# Patient Record
Sex: FEMALE | Employment: OTHER | URBAN - METROPOLITAN AREA
[De-identification: names, ages, dates, MRNs, and addresses within clinical notes are randomized per-mention and may not be internally consistent; named-entity substitution may affect disease eponyms.]

---

## 2017-06-02 ENCOUNTER — HOSPITAL ENCOUNTER (OUTPATIENT)
Dept: PHYSICAL THERAPY | Age: 57
Discharge: HOME | End: 2017-06-02
Payer: COMMERCIAL

## 2017-06-02 PROCEDURE — 97110 THERAPEUTIC EXERCISES: CPT

## 2017-06-02 PROCEDURE — 97530 THERAPEUTIC ACTIVITIES: CPT

## 2017-06-02 PROCEDURE — 97161 PT EVAL LOW COMPLEX 20 MIN: CPT

## 2017-06-02 PROCEDURE — 97140 MANUAL THERAPY 1/> REGIONS: CPT

## 2017-06-02 NOTE — PROGRESS NOTES
PHYSICAL THERAPY - DAILY TREATMENT NOTE    Patient Name: Suzan Mccullough        Date: 2017  : 1960   YES Patient  Verified  Visit #:   1     Insurance: Payor: Angelo Terry / Plan: Keya Camargo / Product Type: Federal Funded Programs /      In time: 9:15 Out time: 10:00   Total Treatment Time: 45 min     Medicare Time Tracking (below)   Total Timed Codes (min):  45 1:1 Treatment Time:  45     TREATMENT AREA =  S/P L radius ORIF    SUBJECTIVE    Pain Level (on 0 to 10 scale):  0-6  / 10   Medication Changes/New allergies or changes in medical history, any new surgeries or procedures? NO    If yes, update Summary List   Subjective Functional Status/Changes:  []  No changes reported     Pt reports she was walking down the street, and tripped over the tree roots on the sidewalk, her toe got caught and she fell out onto her hand. Pt denies LOC or head injury. Patient reports surgery on 2017 on L distal radius fracture. Pt reports she was in a cast from 17 - until approximately 17. Pt states that she is supposed to wear the brace PRN. Pt reports MD took the cast off to being PT. Pt reports MD gave her instructions not to lift. Pt reports non-compliance with instructions with these instructions.        OBJECTIVE    Physical Therapy Evaluation- Elbow    Upper Extremity ROM                        [] Unable to assess at this time   WNL N/A ROM as follows:    Left Shoulder [x] []     Right Shoulder [x] []       WNL N/A Flex Ext Sup Pro Pain   Left Elbow [x]  [] 149 0   [] Yes   [x] No   Right Elbow [x] [] n/a n/a   [] Yes   [] No   Left Wrist [x] [] 44/50 24/50 28/40 35/45 [x] Yes   [] No   Right Wrist [x] []     [] Yes   [] No   L Ulnar deviation: 22  L Radial deviation: 20       Upper Extremity Strength: (0-5)  [] Unable to assess at this time   WNL N/A Flex Ext IR ER Abd Add   L Shoulder [x] []         R Shoulder [x] []           Flexibility:                    [] Unable to assess at this time        Pain  Common Flexor Group WNL Min Mod Severe [] Yes   [x] No   (L) Tightness = [] [] [x] [] [] Yes   [x] No   (R) Tightness =  [x] [] [] [] [] Yes   [x] No   Common Extensor Group     [] Yes   [x] No   (L) Tightness = [] [] [x] [] [] Yes   [x] No   (R) Tightness = [x] [] [] [] [] Yes   [x] No     Palpation  [] Min  [x] Mod  [] Severe    Location: Charley-incisional, medial and lateral extensor bundles         Strength:  Dynamometer position 2   Right (lbs) Left (lbs) Pain/location   Elbow Flexed: (90 deg) 77.5 5 No pain     Optional Tests:  Resisted Finger Test:   2nd & 3rd finger extension (ECRB or ECRL):[x] Neg   [] Pos  3rd finger extension (PIN): [x] Neg   [] Pos    Neural Mobility Test:    Radial Nerve: [x] Neg   [] Pos    Describe:  Median Nerve: [x] Neg   [] Pos    Describe:  Ulnar Nerve:  [x] Neg   [] Pos    Describe:    8 min Therapeutic Exercise:  [x]  See flow sheet   Rationale:      increase ROM, increase strength and improve coordination to improve the patients ability to improve wrist AROM for inc ease with ADLs and self care duties     8 min Therapeutic Activity: FOTO   Rationale: To assess current functional limitations and review POC    8 min Manual Therapy: Cross friction massage, STM to wrist flex/ext bundles   Rationale: To decrease pain, increase ROM and improve tissue extensibility to dec scar tissue adherence    throughout min Patient Education:  YES  Reviewed HEP   []  Progressed/Changed HEP based on:         Other Objective/Functional Measures:    See objective above     Post Treatment Pain Level (on 0 to 10) scale:   7  / 10     ASSESSMENT    Assessment/Changes in Function:     See POC     []  See Progress Note/Recertification   Patient will continue to benefit from skilled PT services to modify and progress therapeutic interventions, address functional mobility deficits, address ROM deficits, address strength deficits, analyze and address soft tissue restrictions, analyze and cue movement patterns, analyze and modify body mechanics/ergonomics, assess and modify postural abnormalities and instruct in home and community integration to attain remaining goals. to attain remaining goals.    Progress toward goals / Updated goals:    See POC     PLAN    [x]  Upgrade activities as tolerated YES Continue plan of care   []  Discharge due to :    []  Other:      Therapist: Maya Damon    Date: 6/2/2017 Time: 9:08 AM

## 2017-06-02 NOTE — PROGRESS NOTES
[address] [facility] PHYSICAL THERAPY  [address] [facility] [address], [address] - Phone: [phone]  Fax: [phone] / [address] [facility]  Patient Name: Luz Reddy : 1960   Medical   Diagnosis: Left arm pain [M79.602] Treatment Diagnosis: S/P L distal radius ORIF   Onset Date: 2016 AGE: 61 y.o. Referral Source: Jhoana Lawrence, * Start of Care [facility]): 2017   Prior Hospitalization: See medical history Provider #: [id number]   Prior Level of Function: Independent without limitations   Comorbidities: BMI, Depression/Anxiety, Arthritis, HTN, Alcohol use   Medications: Verified on Patient Summary List   The Plan of Care and following information is based on the information from the initial evaluation.   =======================================================================================  Assessment / key information:  Patient is a 61 y.o. female who presents with chief complaint of R wrist pain following L distal radius ORIF on 2016. Pt reports sustaining L radius fracture after fall onto outstretched hands on community sidewalk. Pt reports she intended to begin therapy 2 weeks s/p however was unable to attend secondary to transportation issues. Pt reports provided with instructions to avoid lifting however has not been compliant with this. Pt presents to PT evaluation with dec wrist AROM in all planes (most notably flexion/extension), dec LUE strength, dec L  strength, normal scar healing with closed incision, shalini-incisional tenderness to palpation and tightness noted in wrist flexor/extensor bundles. Patient would benefit from skilled PT services to address these issues and improve the above mentioned impairments.   Thank you for this referral     ======================================================================================  Eval Complexity: History: MEDIUM  Complexity : 1-2 comorbidities / personal factors will impact the outcome/ POC Exam:LOW Complexity : 1-2 Standardized tests and measures addressing body structure, function, activity limitation and / or participation in recreation  Presentation: LOW Complexity : Stable, uncomplicated  Clinical Decision Making:MEDIUM Complexity : FOTO score of 26-74Overall Complexity:LOW   Problem List: pain affecting function, decrease ROM, decrease strength, edema affecting function, decrease ADL/ functional abilitiies, decrease activity tolerance, decrease flexibility/ joint mobility and decrease transfer abilities   Treatment Plan may include any combination of the following: Therapeutic exercise, Therapeutic activities, Neuromuscular re-education, Physical agent/modality, Gait/balance training, Manual therapy, Patient education, Self Care training, Functional mobility training and Home safety training  Patient / Family readiness to learn indicated by: asking questions, trying to perform skills and interest  Persons(s) to be included in education: patient (P)  Barriers to Learning/Limitations: None  Measures taken:    Patient Goal (s): \"100% pre-accident condition. \"   Patient self reported health status: good  Rehabilitation Potential: good   Short Term Goals: To be accomplished in  2-3  Weeks:  1. Patient will be compliant with HEP in order to facilitate progression of therapeutic exercise and improve mobility. 2. Patient will improve L wrist flexion AROM 60 degrees in order to improve ease with ADLs  3. Patient will improve L wrist supination >/= 60 degrees in order to improve ease with driving, dressing and ADLs.  Long Term Goals: To be accomplished in  4-6  Weeks:  1. Patient will be independent with HEP in order to demonstrate ability to perform therapeutic exercises and continue progressing functional mobility upon D/C  2.  Patient will improve L wrist flexion/extension/pronation/supination AROM within 5 degrees of uninvolved wrist in order to improve return to PLOF. 3. Patient will demonstrate L  strength >/= 40# in order to improve ease with grasping, holding and transferring objects. 4. Patient will improve FOTO score to 56/100 to demonstrate a meaningful improvement in functional mobility and increased quality of life      Frequency / Duration:   Patient to be seen  1-2  times per week for 4-6  weeks:  Patient / Caregiver education and instruction: self care, activity modification and exercises  G-Codes (GP): n/a  Therapist Signature: Shay Fagan DPT Date: 6/2/9049   Certification Period: n/a Time: 10:40 AM   ===========================================================================================  I certify that the above Physical Therapy Services are being furnished while the patient is under my care. I agree with the treatment plan and certify that this therapy is necessary. Physician Signature:        Date:       Time:     Please sign and return to [facility] or you may fax the signed copy to [phone]. Thank you.

## 2017-06-09 ENCOUNTER — APPOINTMENT (OUTPATIENT)
Dept: PHYSICAL THERAPY | Age: 57
End: 2017-06-09
Payer: COMMERCIAL

## 2017-06-12 ENCOUNTER — HOSPITAL ENCOUNTER (OUTPATIENT)
Dept: PHYSICAL THERAPY | Age: 57
End: 2017-06-12
Payer: COMMERCIAL

## 2017-06-13 ENCOUNTER — HOSPITAL ENCOUNTER (OUTPATIENT)
Dept: PHYSICAL THERAPY | Age: 57
Discharge: HOME | End: 2017-06-13
Payer: COMMERCIAL

## 2017-06-13 PROCEDURE — 97110 THERAPEUTIC EXERCISES: CPT

## 2017-06-13 NOTE — PROGRESS NOTES
PHYSICAL THERAPY - DAILY TREATMENT NOTE    Patient Name: Kate Goldstein        Date: 2017  : 1960   YES Patient  Verified  Visit #:   2   of     Insurance: Payor: Bradly Salgado / Plan: Destiny Kaur / Product Type: Federal Funded Programs /      In time: 3:25 Out time: 3:55   Total Treatment Time: 30     Medicare Time Tracking (below)   Total Timed Codes (min):  30 1:1 Treatment Time:  30     TREATMENT AREA =  Left arm pain [M79.602]  SUBJECTIVE    Pain Level (on 0 to 10 scale):  0  / 10   Medication Changes/New allergies or changes in medical history, any new surgeries or procedures? NO    If yes, update Summary List   Subjective Functional Status/Changes:  []  No changes reported     Pt reports intermittent electrical shock sensation L wrist and swelling L wrist.  Pt reports that she has been doing exercises at home. Pt reports that an insect bit her R upper arm, and she has had some redness and swelling. Pt initially states that she is going to see her doctor after her PT appointment, and wants to proceed with PT treatment, then during PT session called doctor to make appointment and stated that she was going to doctor from PT appointment. Pt requested that her BP be taken, because she took it at home this morning and it was 88/64. Pt states that she feels dizzy when doing UT stretch. Pt reports that dizziness resolved when she opened her eyes after PT advised her to try doing stretch with eyes open (pt had initially performed stretch with eyes closed).        OBJECTIVE    25 min Therapeutic Exercise:  [x]  See flow sheet   Rationale:      increase ROM to improve the patients ability to perform ADLs/IADLs without increased pain     5 min Patient Education:  YES  Reviewed HEP   []  Progressed/Changed HEP based on:   Cont ROM exercises in pain-free ROM (added finger to thumb and UT stretch); advised pt to f/u with MD regarding redness/swelling R upper arm     Other Objective/Functional Measures:    BP = 124/88  Noted redness/swelling R posterior upper arm with small raised bump in center of area of redness/swelling  Noted swelling L medial wrist     Post Treatment Pain Level (on 0 to 10) scale:   5  / 10 L hand     ASSESSMENT    Assessment/Changes in Function:     Tolerated exercises - no c/o increased pain during exercises, but reports increased pain after completing exercises     []  See Progress Note/Recertification   Patient will continue to benefit from skilled PT services to modify and progress therapeutic interventions, address functional mobility deficits, address ROM deficits, address strength deficits, analyze and address soft tissue restrictions, analyze and cue movement patterns, analyze and modify body mechanics/ergonomics and assess and modify postural abnormalities to attain remaining goals. Progress toward goals / Updated goals:    Progressing slowly toward goals:  1. Patient will be compliant with HEP in order to facilitate progression of therapeutic exercise and improve mobility. - Reports compliance with HEP  2. Patient will improve L wrist flexion AROM 60 degrees in order to improve ease with ADLs - Cont ROM ex  3. Patient will improve L wrist supination >/= 60 degrees in order to improve ease with driving, dressing and ADLs.  - Cont ROM ex     PLAN    [x]  Upgrade activities as tolerated YES Continue plan of care   []  Discharge due to :    []  Other:      Therapist: Clifford Aviles PT    Date: 6/13/2017 Time: 3:28 PM     Future Appointments  Date Time Provider Chao Lazcano   6/13/2017 3:30 PM Clifford Aviles PT [facility]   6/19/2017 10:00 AM Yandel Parker PT [facility]   6/21/2017 1:00 PM Yandel Parker PT [facility]   6/28/2017 1:30 PM Clifford Aviles PT [facility]   6/30/2017 11:30 AM Clifford Aviles PT [facility]

## 2017-06-19 ENCOUNTER — HOSPITAL ENCOUNTER (OUTPATIENT)
Dept: PHYSICAL THERAPY | Age: 57
Discharge: HOME | End: 2017-06-19
Payer: COMMERCIAL

## 2017-06-19 PROCEDURE — 97110 THERAPEUTIC EXERCISES: CPT

## 2017-06-19 NOTE — PROGRESS NOTES
PHYSICAL THERAPY - DAILY TREATMENT NOTE    Patient Name: Kaylan Sales        Date: 2017  : 1960   YES Patient  Verified  Visit #:   3     Insurance: Payor: Yenny Hernandez / Plan: Martha Marquis / Product Type: Federal Funded Programs /      In time: 1:15 Out time: 1:55   Total Treatment Time: 40     Medicare Time Tracking (below)   Total Timed Codes (min):  30 1:1 Treatment Time:  30     TREATMENT AREA =  L wrist    SUBJECTIVE    Pain Level (on 0 to 10 scale):  0  / 10   Medication Changes/New allergies or changes in medical history, any new surgeries or procedures? NO    If yes, update Summary List   Subjective Functional Status/Changes:  []  No changes reported     \"it seems to be swelling, maybe because its hot. Will my wrist always look like this? \"          OBJECTIVE    Modalities Rationale:  Decrease pain   min [] Estim, type/location:                                      []  att     []  unatt     []  w/US     []  w/ice    []  w/heat    min []  Mechanical Traction: type/lbs                   []  pro   []  sup   []  int   []  cont    []  before manual    []  after manual    min []  Ultrasound, settings/location:      min []  Iontophoresis w/ dexamethasone, location:                                               []  take home patch       []  in clinic   10 min [x]  Ice     []  Heat    location/position: seated    min []  Vasopneumatic Device, press/temp:     min []  Other:    [] Skin assessment post-treatment (if applicable):    []  intact    []  redness- no adverse reaction     []redness  adverse reaction:      30 min Therapeutic Exercise:  [x]  See flow sheet   Rationale:      increase ROM and increase strength to improve the patients ability to acheive full ROM      min Patient Education:  YES  Reviewed HEP   []  Progressed/Changed HEP based on:   Cont HEP     Other Objective/Functional Measures:  Pt c/o thumb discomfort following digit oppsition   Pt reported discomfort along distal radius with AROM wrist extension, \"pulling\"  Noted increased wrist extension ROM with self passive str compared to AROM    Elbow flex AROM 156deg, Wrist ext 30/55, wrist flex 10/24deg  Poor tolerance to gentle putty compression   Post Treatment Pain Level (on 0 to 10) scale:   0  / 10     ASSESSMENT    Assessment/Changes in Function:     No changes noted     []  See Progress Note/Recertification   Patient will continue to benefit from skilled PT services to analyze,, cue,, progress,, modify,, demonstrate,, instruct, and address, movement patterns,, therapeutic interventions,, postural abnormalities,, soft tissue restrictions,, ROM,, strength,, functional mobility,, body mechanics/ergonomics, and home and community integration, to attain remaining goals. Progress toward goals / Updated goals:     Added wrost flex and ext str for ROM     PLAN    []  Upgrade activities as tolerated YES Continue plan of care   []  Discharge due to :    []  Other:      Therapist: Jose Palm DPT    Date: 6/19/2017 Time: 1:30 PM   Future Appointments  Date Time Provider Chao Lazcano   6/21/2017 1:00 PM Meredith Long PT [facility]   6/28/2017 1:30 PM Krishan Garcia PT [facility]   6/30/2017 11:30 AM Krishan Garcia PT [facility]

## 2017-06-21 ENCOUNTER — HOSPITAL ENCOUNTER (OUTPATIENT)
Dept: PHYSICAL THERAPY | Age: 57
Discharge: HOME | End: 2017-06-21
Payer: COMMERCIAL

## 2017-06-21 PROCEDURE — 97140 MANUAL THERAPY 1/> REGIONS: CPT

## 2017-06-21 PROCEDURE — 97530 THERAPEUTIC ACTIVITIES: CPT

## 2017-06-21 PROCEDURE — 97110 THERAPEUTIC EXERCISES: CPT

## 2017-06-21 NOTE — PROGRESS NOTES
PHYSICAL THERAPY - DAILY TREATMENT NOTE    Patient Name: Kain Ortiz        Date: 2017  : 1960   YES Patient  Verified  Visit #:  4    Insurance: Payor: Liss Zamudio / Plan: Ladonna Pruitt / Product Type: Federal Funded Programs /      In time: 1:00 Out time: 1:50   Total Treatment Time: 50     Medicare Time Tracking (below)   Total Timed Codes (min):  NA 1:1 Treatment Time:  NA     TREATMENT AREA =  L wrist    SUBJECTIVE    Pain Level (on 0 to 10 scale):  0  / 10   Medication Changes/New allergies or changes in medical history, any new surgeries or procedures? NO    If yes, update Summary List   Subjective Functional Status/Changes:  []  No changes reported     \"I was a sore for a day but its all right.  I went to the doctor and he wants me to work on scar massage\"          OBJECTIVE    Modalities Rationale:  Palliative   min [] Estim, type/location:                                      []  att     []  unatt     []  w/US     []  w/ice    []  w/heat    min []  Mechanical Traction: type/lbs                   []  pro   []  sup   []  int   []  cont    []  before manual    []  after manual    min []  Ultrasound, settings/location:      min []  Iontophoresis w/ dexamethasone, location:                                               []  take home patch       []  in clinic   10 min [x]  Ice     []  Heat    location/position: seated    min []  Vasopneumatic Device, press/temp:     min []  Other:    [] Skin assessment post-treatment (if applicable):    []  intact    []  redness- no adverse reaction     []redness  adverse reaction:      30 min Therapeutic Exercise:  [x]  See flow sheet   Rationale:      increase ROM and increase strength to improve the patients ability to perform ADLs and decrease risk of contracture     10 min Manual Therapy: Instruction on scar mobilization, IASTM with small cup to scap   Rationale:      decrease pain and increase ROM to improve patient's ability to maximize ROM     min Patient Education:  YES  Reviewed HEP   []  Progressed/Changed HEP based on:   Cont HEP     Other Objective/Functional Measures:    Reports fatigue with wrist maze and thumb pain with horseshoe stability. VC to decrease digit flexion range improved     Post Treatment Pain Level (on 0 to 10) scale:   0  / 10     ASSESSMENT    Assessment/Changes in Function:     Improving with AROM tolerance, improved tension tension post manual     []  See Progress Note/Recertification   Patient will continue to benefit from skilled PT services to analyze,, cue,, progress,, modify,, demonstrate,, instruct, and address, movement patterns,, therapeutic interventions,, postural abnormalities,, soft tissue restrictions,, ROM,, strength,, functional mobility,, body mechanics/ergonomics, and home and community integration, to attain remaining goals. Progress toward goals / Updated goals: · Short Term Goals: To be accomplished in  2-3  Weeks:  1. Patient will be compliant with HEP in order to facilitate progression of therapeutic exercise and improve mobility. 2. Patient will improve L wrist flexion AROM 60 degrees in order to improve ease with ADLs  3. Patient will improve L wrist supination >/= 60 degrees in order to improve ease with driving, dressing and ADLs. Added supination roller     · Long Term Goals: To be accomplished in  4-6  Weeks:  1. Patient will be independent with HEP in order to demonstrate ability to perform therapeutic exercises and continue progressing functional mobility upon D/C  2. Patient will improve L wrist flexion/extension/pronation/supination AROM within 5 degrees of uninvolved wrist in order to improve return to PLOF. 3. Patient will demonstrate L  strength >/= 40# in order to improve ease with grasping, holding and transferring objects.   4. Patient will improve FOTO score to 56/100 to demonstrate a meaningful improvement in functional mobility and increased quality of life     PLAN    []  Upgrade activities as tolerated YES Continue plan of care   []  Discharge due to :    []  Other:      Therapist: Domenico Grande DPT    Date: 6/21/2017 Time: 1:28 PM   Future Appointments  Date Time Provider Chao Lazcano   6/28/2017 1:30 PM Rhina Clark PT [facility]   6/30/2017 11:30 AM Rhina Clark PT [facility]

## 2017-06-28 ENCOUNTER — HOSPITAL ENCOUNTER (OUTPATIENT)
Dept: PHYSICAL THERAPY | Age: 57
Discharge: HOME | End: 2017-06-28
Payer: COMMERCIAL

## 2017-06-28 PROCEDURE — 97110 THERAPEUTIC EXERCISES: CPT

## 2017-06-28 NOTE — PROGRESS NOTES
PHYSICAL THERAPY - DAILY TREATMENT NOTE    Patient Name: Stacey Rizvi        Date: 2017  : 1960   YES Patient  Verified  Visit #:   5     Insurance: Payor: Ann-Marie Talbert / Plan: [employer] / Product Type: Federal Funded Programs /      In time: 1:40 Out time: 2:25   Total Treatment Time: 45     Medicare Time Tracking (below)   Total Timed Codes (min):  NA 1:1 Treatment Time:  NA     TREATMENT AREA =  Left arm pain [M79.602]  SUBJECTIVE    Pain Level (on 0 to 10 scale):  5  / 10 L wrist   Medication Changes/New allergies or changes in medical history, any new surgeries or procedures? NO    If yes, update Summary List   Subjective Functional Status/Changes:  []  No changes reported     Pt reports 6/10 L wrist pain last night, reports that she had to get out of bed and ice her wrist.  Pt reports that she has been wearing a glove to help with gripping while driving. Pt reports that she has been massaging over her incision. Pt reports that she has noticed that the swelling has gone down. Pt requests that I check her BP.        OBJECTIVE    Modalities Rationale: Palliative        min [] Estim, type/location:                                      []  att     []  unatt     []  w/US     []  w/ice    []  w/heat    min []  Mechanical Traction: type/lbs                   []  pro   []  sup   []  int   []  cont    []  before manual    []  after manual    min []  Ultrasound, settings/location:      min []  Iontophoresis w/ dexamethasone, location:                                               []  take home patch       []  in clinic   10 min [x]  Ice     []  Heat    location/position: L wrist, seated    min []  Vasopneumatic Device, press/temp:     min []  Other:    [x] Skin assessment post-treatment (if applicable):    []  intact    []  redness- no adverse reaction     []redness  adverse reaction:      35 min Therapeutic Exercise:  [x]  See flow sheet   Rationale:      increase ROM, increase strength, improve coordination and increase proprioception to improve the patients ability to perform ADLs/IADLs, functional mobility and gait safely and independently without increased pain/symptoms     During TE min Patient Education:  YES  Reviewed HEP   []  Progressed/Changed HEP based on:   Cont HEP     Other Objective/Functional Measures:    BP = 120/80 mmHg  Exercises per flowsheet - increased reps thumb opposition to digits 2-5     Post Treatment Pain Level (on 0 to 10) scale:   2  / 10     ASSESSMENT    Assessment/Changes in Function:     Tolerated exercises without complaints     []  See Progress Note/Recertification   Patient will continue to benefit from skilled PT services to modify and progress therapeutic interventions, address functional mobility deficits, address ROM deficits, address strength deficits, analyze and address soft tissue restrictions, analyze and cue movement patterns and assess and modify postural abnormalities to attain remaining goals. Progress toward goals / Updated goals:    Progressing toward goals: · Short Term Goals: To be accomplished in  2-3  Weeks:  1. Patient will be compliant with HEP in order to facilitate progression of therapeutic exercise and improve mobility. Reports compliance  2. Patient will improve L wrist flexion AROM 60 degrees in order to improve ease with ADLs. Cont ROM ex  3. Patient will improve L wrist supination >/= 60 degrees in order to improve ease with driving, dressing and ADLs. Cont ROM ex      · Long Term Goals: To be accomplished in  4-6  Weeks:  1. Patient will be independent with HEP in order to demonstrate ability to perform therapeutic exercises and continue progressing functional mobility upon D/C  2. Patient will improve L wrist flexion/extension/pronation/supination AROM within 5 degrees of uninvolved wrist in order to improve return to PLOF.   3. Patient will demonstrate L  strength >/= 40# in order to improve ease with grasping, holding and transferring objects.   4. Patient will improve FOTO score to 56/100 to demonstrate a meaningful improvement in functional mobility and increased quality of life       PLAN    [x]  Upgrade activities as tolerated YES Continue plan of care   []  Discharge due to :    []  Other:      Therapist: Itz Garcia PT    Date: 6/28/2017 Time: 1:42 PM     Future Appointments  Date Time Provider Chao Fernandezi   6/30/2017 11:30 AM Itz Garcia PT [facility]

## 2017-06-30 ENCOUNTER — HOSPITAL ENCOUNTER (OUTPATIENT)
Dept: PHYSICAL THERAPY | Age: 57
Discharge: HOME | End: 2017-06-30
Payer: COMMERCIAL

## 2017-06-30 PROCEDURE — 97530 THERAPEUTIC ACTIVITIES: CPT

## 2017-06-30 PROCEDURE — 97110 THERAPEUTIC EXERCISES: CPT

## 2017-06-30 NOTE — PROGRESS NOTES
[address] [facility] PHYSICAL THERAPY  [address] [place] [address] [place] Alvin Gibson, [address] - Phone: [phone]  Fax: [phone]  PROGRESS NOTE  Patient Name: Rosas Preston : 1960   Treatment/Medical Diagnosis: Left arm pain [M79.602]   Referral Source: Hermilo Rocha, *     Date of Initial Visit: 2017 Attended Visits: 6 Missed Visits: 1     SUMMARY OF TREATMENT  Treatment has consisted of initial evaluation and 5 treatment sessions, which have included gentle ROM/stretching exercises, manual therapy techniques to mobilize scar, modalities (cold pack) for pain relief and patient education. CURRENT STATUS  Patient has progressed with exercises in clinic, and reports compliance with HEP. FOTO (Functional Status) has increased from 30/100 to 37/100, indicating increased function/decreased functional limitation. L wrist AROM increased with flex = 45, ext = 55, sup = 85, pro = 75, ulnar dev = 35 and radial dev = 30. Patient has reported 5/10 pain at worst in clinic over past 2 weeks. Goal/Measure of Progress Goal Met? 1. Patient will be compliant with HEP in order to facilitate progression of therapeutic exercise and improve mobility. Status at last Eval: NA Current Status: Compliant with HEP yes   2. Patient will improve L wrist flexion AROM 60 degrees in order to improve ease with ADLs   Status at last Eval: L wrist flex = 44 Current Status: L wrist flex = 45 no   3. Patient will improve L wrist supination >/= 60 degrees in order to improve ease with driving, dressing and ADLs. Status at last Eval: L wrist sup = 28 Current Status: L wrist sup = 85 yes     New Goals to be achieved in __2-4__  weeks:  1. Patient will be independent with HEP in order to demonstrate ability to perform therapeutic exercises and continue progressing functional mobility upon D/C  2.  Patient will improve L wrist flexion/extension/pronation/supination AROM within 5 degrees of uninvolved wrist in order to improve return to PLOF. 3. Patient will demonstrate L  strength >/= 40# in order to improve ease with grasping, holding and transferring objects. 4. Patient will improve FOTO score to 56/100 to demonstrate a meaningful improvement in functional mobility and increased quality of life  RECOMMENDATIONS  Patient would benefit from continued skilled PT services to address pain, edema, decreased ROM, decreased strength, decreased ADL/IADL function to allow increased independence with ADLs/IADLs and return to prior level of function. If you have any questions/comments please contact us directly at [phone]. Thank you for allowing us to assist in the care of your patient. Therapist Signature: Kat Kuo PT Date: 6/30/2017     Time: 12:04 PM   NOTE TO PHYSICIAN:  PLEASE COMPLETE THE ORDERS BELOW AND FAX TO   [facility]: [phone] If you are unable to process this request in 24 hours please contact our office: [phone].    ___ I have read the above report and request that my patient continue as recommended.   ___ I have read the above report and request that my patient continue therapy with the following changes/special instructions:_________________________________________________________   ___ I have read the above report and request that my patient be discharged from therapy.      Physician Signature:        Date:       Time:

## 2017-06-30 NOTE — PROGRESS NOTES
PHYSICAL THERAPY - DAILY TREATMENT NOTE    Patient Name: Adelina Friday        Date: 2017  : 1960   YES Patient  Verified  Visit #:     Insurance: Payor: Stacey Ambriz / Plan: Lexii Hernandez / Product Type: Federal Funded Programs /      In time: 11:20 Out time: 12:10   Total Treatment Time: 50     Medicare Time Tracking (below)   Total Timed Codes (min):  NA 1:1 Treatment Time:  NA     TREATMENT AREA =  Left arm pain [M79.602]  SUBJECTIVE    Pain Level (on 0 to 10 scale):  4  / 10   Medication Changes/New allergies or changes in medical history, any new surgeries or procedures? NO    If yes, update Summary List   Subjective Functional Status/Changes:  []  No changes reported     Pt states that she wants to work on range of motion, pushing, pulling and weightbearing. Pt states that she wants to have less pain and have her scar look better. Pt states that she wants to be able to mow the grass, cook for herself, drive without a glove on her hand. Pt states that there are a lot of things that she has to do because she lives alone that she is unable to do. Pt reports that she may have to go see a neurologist because she has a tumor or her pituitary gland. Pt states that she is trying to get an MRI scheduled.        OBJECTIVE    Modalities Rationale: Palliative       min [] Estim, type/location:                                      []  att     []  unatt     []  w/US     []  w/ice    []  w/heat    min []  Mechanical Traction: type/lbs                   []  pro   []  sup   []  int   []  cont    []  before manual    []  after manual    min []  Ultrasound, settings/location:      min []  Iontophoresis w/ dexamethasone, location:                                               []  take home patch       []  in clinic   10 min [x]  Ice     []  Heat    location/position: L wrist, seated    min []  Vasopneumatic Device, press/temp:     min []  Other:    [x] Skin assessment post-treatment (if applicable):    [x]  intact    []  redness- no adverse reaction     []redness  adverse reaction:      30 min Therapeutic Exercise:  [x]  See flow sheet   Rationale:      increase ROM, increase strength and improve coordination to improve the patients ability to perform ADLs/IADLs without increased pain     10 min Therapeutic Activity: FOTO   Rationale: assess ADL/IADL function, functional mobility and gait safely and independently without increased pain      During TE min Patient Education:  YES  Reviewed HEP   []  Progressed/Changed HEP based on:   Cont HEP     Other Objective/Functional Measures:    FOTO = 37/100    L wrist AROM (before, after ROM exercises):  Flex = 40, 45 (55 R)  Ext = 40, 55 (85 R)  Sup = 80, 85 (80 R)  Pro = 70, 75 (80 R)  Ulnar dev = 22, 35 (50 R)  Radial dev = 20, 20 (30 R)     Post Treatment Pain Level (on 0 to 10) scale:   0  / 10     ASSESSMENT    Assessment/Changes in Function:     See progress note     []  See Progress Note/Recertification   Patient will continue to benefit from skilled PT services to modify and progress therapeutic interventions, address functional mobility deficits, address ROM deficits, address strength deficits, analyze and address soft tissue restrictions, analyze and cue movement patterns, analyze and modify body mechanics/ergonomics, assess and modify postural abnormalities and instruct in home and community integration to attain remaining goals.    Progress toward goals / Updated goals:    See progress note     PLAN    [x]  Upgrade activities as tolerated YES Continue plan of care   []  Discharge due to :    []  Other:      Therapist: Poli Acevedo PT    Date: 6/30/2017 Time: 11:27 AM     Future Appointments  Date Time Provider Chao Lazcano   6/30/2017 11:30 AM Poli Acevedo PT [facility]

## 2017-07-03 ENCOUNTER — HOSPITAL ENCOUNTER (OUTPATIENT)
Dept: PHYSICAL THERAPY | Age: 57
Discharge: HOME | End: 2017-07-03
Payer: OTHER GOVERNMENT

## 2017-07-03 PROCEDURE — 97110 THERAPEUTIC EXERCISES: CPT

## 2017-07-03 PROCEDURE — 97140 MANUAL THERAPY 1/> REGIONS: CPT

## 2017-07-03 NOTE — PROGRESS NOTES
PHYSICAL THERAPY - DAILY TREATMENT NOTE      Patient Name: Sebastian Burdick        Date: 7/3/2017  : 1960   YES Patient  Verified  Visit #:   7   of     Insurance: Payor: Nataliia Avendaño / Plan: Jason Harris / Product Type: Federal Funded Programs /      In time: 2:25 Out time: 3:12   Total Treatment Time: 47       TREATMENT AREA =  Left arm pain [M79.602]    SUBJECTIVE    Pain Level (on 0 to 10 scale):  2  / 10   Medication Changes/New allergies or changes in medical history, any new surgeries or procedures? NO    If yes, update Summary List   Subjective Functional Status/Changes:  []  No changes reported     Pt c/o pain along dorsal aspect of R forearm (midportion) along wrist extensors.         OBJECTIVE    Modalities Rationale:        min [] Estim, type/location:                                      []  att     []  unatt     []  w/US     []  w/ice    []  w/heat    min []  Mechanical Traction: type/lbs                   []  pro   []  sup   []  int   []  cont    []  before manual    []  after manual    min []  Ultrasound, settings/location:      min []  Iontophoresis w/ dexamethasone, location:                                               []  take home patch       []  in clinic   10 min [x]  Ice     []  Heat    location/position: Seated to L wrist    min []  Vasopneumatic Device, press/temp:     min []  Other:    [] Skin assessment post-treatment (if applicable):    []  intact    []  redness- no adverse reaction     []redness  adverse reaction:      29 min Therapeutic Exercise:  [x]  See flow sheet   Rationale:      increase ROM, increase strength, improve coordination, improve balance and increase proprioception to improve the patients ability to increase pt's stability/mobility and improve functional activity and ability to perform ADL's    8 min Manual Therapy: STM to L wrist extensors, gentle dorsal/ventral glide at distal radioulnar joint   Rationale:      decrease pain, increase ROM, increase tissue extensibility and increase postural awareness to improve patient's ability to perform functional activities and decrease pain. 1 min Patient Education:  YES  Reviewed HEP   []  Progressed/Changed HEP based on:   Provided pink puddy for gripping at home. Instructed to perform during commercial breaks. Other Objective/Functional Measures: Added red therabar flexion/extension. C/o slight inc pain in extensor mass during resisted ext. Abolished once exercise completed  Added pink putty squeezes including gripping x 2 minutes. Then performed rolling putty out with L hand followed by pincer  thumb to digits 2-5. Post Treatment Pain Level (on 0 to 10) scale:   0  / 10     ASSESSMENT    Assessment/Changes in Function:     Patient tolerated gentle strengthening progression well today. Most discomfort is in lateral forearm (extensor bundle). []  See Progress Note/Recertification   Patient will continue to benefit from skilled PT services to modify and progress therapeutic interventions, address functional mobility deficits, address ROM deficits, address strength deficits, analyze and address soft tissue restrictions, analyze and cue movement patterns, analyze and modify body mechanics/ergonomics, assess and modify postural abnormalities, address imbalance/dizziness and instruct in home and community integration to attain remaining goals. Progress toward goals / Updated goals:    New Goals to be achieved in __2-4__  weeks:  1. Patient will be independent with HEP in order to demonstrate ability to perform therapeutic exercises and continue progressing functional mobility upon D/C  2. Patient will improve L wrist flexion/extension/pronation/supination AROM within 5 degrees of uninvolved wrist in order to improve return to PLOF. 3. Patient will demonstrate L  strength >/= 40# in order to improve ease with grasping, holding and transferring objects.   4. Patient will improve FOTO score to 56/100 to demonstrate a meaningful improvement in functional mobility and increased quality of life       PLAN    [x]  Upgrade activities as tolerated YES Continue plan of care   []  Discharge due to :    []  Other:      Therapist: Silver Li DPT    Date: 7/3/2017 Time: 12:46 PM     Future Appointments  Date Time Provider Chao Lazcano   7/3/2017 2:30 PM [facility]   7/5/2017 8:30 AM Kath Mark, [facility]   7/10/2017 10:00 AM Jessica Platejose, [facility]   7/12/2017 8:30 AM Kath Mark, [facility]   7/17/2017 1:00 PM Jessica Platejose, [facility]   7/20/2017 11:30 AM Kath Mark, [facility]   7/24/2017 1:00 PM Georga Platejose, [facility]   7/27/2017 10:30 AM Georga Plater, [facility]

## 2017-07-12 ENCOUNTER — HOSPITAL ENCOUNTER (OUTPATIENT)
Dept: PHYSICAL THERAPY | Age: 57
Discharge: HOME | End: 2017-07-12
Payer: OTHER GOVERNMENT

## 2017-07-12 PROCEDURE — 97110 THERAPEUTIC EXERCISES: CPT

## 2017-07-12 NOTE — PROGRESS NOTES
PHYSICAL THERAPY - DAILY TREATMENT NOTE    Patient Name: Nicolasa Rodriguez        Date: 2017  : 1960   YES Patient  Verified  Visit #:   8     Insurance: Payor: Cyrus Rossi / Plan: [employer] / Product Type: Federal Funded Programs /      In time: 8:35 Out time: 9:15   Total Treatment Time: 40     Medicare Time Tracking (below)   Total Timed Codes (min):  40 1:1 Treatment Time:  30     TREATMENT AREA =  Left arm pain [M79.602]  SUBJECTIVE    Pain Level (on 0 to 10 scale):  4  / 10 L foot, 4/10 R knee, 3/10 neck/B shoulder   Medication Changes/New allergies or changes in medical history, any new surgeries or procedures? NO    If yes, update Summary List   Subjective Functional Status/Changes:  []  No changes reported     Pt states that she thinks that the warm weather is causing her to be achy. Pt c/o L wrist, R knee and neck/B shoulder pain. Pt reports that she still has swelling L wrist.  Pt reports that she has been massaging wrist and she thinks that it has been helping with swelling and scar healing. Pt states that she missed appt last Wednesday because she was sore from working in yard. Pt states that she missed appt Monday because she was double-booked with appt at [place]. Pt states that she called and let us know that she was going to miss appt.        OBJECTIVE    Modalities Rationale: Palliative    min [] Estim, type/location:                                      []  att     []  unatt     []  w/US     []  w/ice    []  w/heat    min []  Mechanical Traction: type/lbs                   []  pro   []  sup   []  int   []  cont    []  before manual    []  after manual    min []  Ultrasound, settings/location:      min []  Iontophoresis w/ dexamethasone, location:                                               []  take home patch       []  in clinic   10 min [x]  Ice     []  Heat    location/position: L wrist, seated     min []  Vasopneumatic Device, press/temp: min []  Other:    [x] Skin assessment post-treatment (if applicable):    [x]  intact    []  redness- no adverse reaction     []redness  adverse reaction:      25 min Therapeutic Exercise:  [x]  See flow sheet   Rationale:      increase ROM, increase strength and increase proprioception to improve the patients ability to perform ADLs/IADLs without increased pain     5 min Manual Therapy: Retrograde massage L wrist/forearm and scar massage L wrist incision   Rationale:      decrease pain and increase ROM to improve patient's ability to maximize ROM     During TE min Patient Education:  YES  Reviewed HEP   []  Progressed/Changed HEP based on:   Cont HEP; discussed importance of regular attendance at PT sessions, calling (24 hrs in advance if possible) if needs to cancel or reschedule; advised that she may have to be discharged if she misses 3 or more appts     Other Objective/Functional Measures:    Exercises per flowsheet - increased reps     Post Treatment Pain Level (on 0 to 10) scale:   1  / 10     ASSESSMENT    Assessment/Changes in Function:     Tolerated exercises without c/o increased pain     []  See Progress Note/Recertification   Patient will continue to benefit from skilled PT services to modify and progress therapeutic interventions, address ROM deficits, address strength deficits, analyze and address soft tissue restrictions, analyze and cue movement patterns, analyze and modify body mechanics/ergonomics and assess and modify postural abnormalities to attain remaining goals. Progress toward goals / Updated goals:    Progressing toward goals:  1. Patient will be independent with HEP in order to demonstrate ability to perform therapeutic exercises and continue progressing functional mobility upon D/C - Reports compliance  2. Patient will improve L wrist flexion/extension/pronation/supination AROM within 5 degrees of uninvolved wrist in order to improve return to PLOF. - Cont ROM ex, increased reps  3. Patient will demonstrate L  strength >/= 40# in order to improve ease with grasping, holding and transferring objects. - Cont gripper, increased reps  4.  Patient will improve FOTO score to 56/100 to demonstrate a meaningful improvement in functional mobility and increased quality of life       PLAN    [x]  Upgrade activities as tolerated YES Continue plan of care   []  Discharge due to :    []  Other:      Therapist: Nancy Holman PT    Date: 7/12/2017 Time: 8:34 AM     Future Appointments  Date Time Provider Chao Lazcano   7/17/2017 1:00 PM Merle Smith PT [facility]   7/20/2017 11:30 AM Nancy Holman PT [facility]   7/24/2017 1:00 PM Merle Smith PT [facility]   7/27/2017 10:30 AM Merle Smith [facility]

## 2017-07-17 ENCOUNTER — HOSPITAL ENCOUNTER (OUTPATIENT)
Dept: PHYSICAL THERAPY | Age: 57
Discharge: HOME | End: 2017-07-17
Payer: OTHER GOVERNMENT

## 2017-07-17 PROCEDURE — 97110 THERAPEUTIC EXERCISES: CPT

## 2017-07-17 NOTE — PROGRESS NOTES
PHYSICAL THERAPY - DAILY TREATMENT NOTE    Patient Name: Caty Zarate        Date: 2017  : 1960   YES Patient  Verified  Visit #:     Insurance: Payor: Zachary Enriquez / Plan: Babatunde Paniagua / Product Type: Federal Funded Programs /      In time: 1:15 Out time: 2:05   Total Treatment Time: 50     Medicare Time Tracking (below)   Total Timed Codes (min):  NA 1:1 Treatment Time:  NA     TREATMENT AREA =  L wrist    SUBJECTIVE    Pain Level (on 0 to 10 scale):  0  / 10   Medication Changes/New allergies or changes in medical history, any new surgeries or procedures?     NO    If yes, update Summary List   Subjective Functional Status/Changes:  []  No changes reported     \"I was hurting and popping yesterday so I put the brace           OBJECTIVE    Modalities Rationale:  Palliative   min [] Estim, type/location:                                      []  att     []  unatt     []  w/US     []  w/ice    []  w/heat    min []  Mechanical Traction: type/lbs                   []  pro   []  sup   []  int   []  cont    []  before manual    []  after manual    min []  Ultrasound, settings/location:      min []  Iontophoresis w/ dexamethasone, location:                                               []  take home patch       []  in clinic   10 min [x]  Ice     []  Heat    location/position: Seated     min []  Vasopneumatic Device, press/temp:     min []  Other:    [] Skin assessment post-treatment (if applicable):    []  intact    []  redness- no adverse reaction     []redness  adverse reaction:      40 min Therapeutic Exercise:  [x]  See flow sheet   Rationale:      increase ROM and increase strength to improve the patients ability to perform ADLs with increased ease       min Patient Education:  YES  Reviewed HEP   []  Progressed/Changed HEP based on:   Cont HEP     Other Objective/Functional Measures:    Pt concerned regarding styloid process on L wrist, stating she did not have it on the right . Palpated and informed pt they are present on both side   Interm soreness reported throughout but not lasting     Post Treatment Pain Level (on 0 to 10) scale:   0  / 10     ASSESSMENT    Assessment/Changes in Function:     Progressing slowly     []  See Progress Note/Recertification   Patient will continue to benefit from skilled PT services to analyze,, cue,, progress,, modify,, demonstrate,, instruct, and address, movement patterns,, therapeutic interventions,, postural abnormalities,, soft tissue restrictions,, ROM,, strength,, functional mobility,, body mechanics/ergonomics, and home and community integration, to attain remaining goals. Progress toward goals / Updated goals:    New Goals to be achieved in __2-4__  weeks:  1. Patient will be independent with HEP in order to demonstrate ability to perform therapeutic exercises and continue progressing functional mobility upon D/C  2. Patient will improve L wrist flexion/extension/pronation/supination AROM within 5 degrees of uninvolved wrist in order to improve return to PLOF.added strength to TE today  3. Patient will demonstrate L  strength >/= 40# in order to improve ease with grasping, holding and transferring objects.   4. Patient will improve FOTO score to 56/100 to demonstrate a meaningful improvement in functional mobility and increased quality of life     PLAN    []  Upgrade activities as tolerated YES Continue plan of care   []  Discharge due to :    []  Other:      Therapist: Karan Ordonez DPT    Date: 7/17/2017 Time: 1:20 PM   Future Appointments  Date Time Provider Chao Lazcano   7/20/2017 11:30 AM June Wood PT [facility]   7/24/2017 1:00 PM Renny Phelps PT [facility]   7/27/2017 10:30 AM Renny Phelps PT [facility]

## 2017-07-20 ENCOUNTER — HOSPITAL ENCOUNTER (OUTPATIENT)
Dept: PHYSICAL THERAPY | Age: 57
Discharge: HOME | End: 2017-07-20
Payer: OTHER GOVERNMENT

## 2017-07-20 PROCEDURE — 97110 THERAPEUTIC EXERCISES: CPT

## 2017-07-20 NOTE — PROGRESS NOTES
PHYSICAL THERAPY - DAILY TREATMENT NOTE    Patient Name: Sanjuana Christina        Date: 2017  : 1960   YES Patient  Verified  Visit #:   10     Insurance: Payor: Jose Gorman / Plan: Roberto Fernandez / Product Type: Federal Funded Programs /      In time: 11:35 Out time: 12:15   Total Treatment Time: 40     Medicare Time Tracking (below)   Total Timed Codes (min):  NA 1:1 Treatment Time:  NA     TREATMENT AREA =  Left arm pain [M79.602]  SUBJECTIVE    Pain Level (on 0 to 10 scale):  4  / 10   Medication Changes/New allergies or changes in medical history, any new surgeries or procedures? NO    If yes, update Summary List   Subjective Functional Status/Changes:  []  No changes reported     Pt reports that she raked the yard Tuesday, and she has had some increased pain since that time. Pt reports that she did not see physician Monday. Pt reports that she had doubled-booked her appointment and physician was not in office. Pt reports that she is now scheduled to see him on 2017.      OBJECTIVE  Modalities Rationale:  Palliative                min [] Estim, type/location:                                       []  att     []  unatt     []  w/US     []  w/ice    []  w/heat     min []  Mechanical Traction: type/lbs                    []  pro   []  sup   []  int   []  cont    []  before manual    []  after manual     min []  Ultrasound, settings/location:        min []  Iontophoresis w/ dexamethasone, location:                                                []  take home patch       []  in clinic   10 min [x]  Ice     []  Heat    location/position: L wrist, seated     min []  Vasopneumatic Device, press/temp:       min []  Other:     [x] Skin assessment post-treatment (if applicable):    [x]  intact    []  redness- no adverse reaction     []redness  adverse reaction:      30 min Therapeutic Exercise:  [x]  See flow sheet   Rationale:      increase ROM, increase strength and increase proprioception to improve the patients ability to perform ADLs/IADLs without increased pain     During TE min Patient Education:  YES  Reviewed HEP   []  Progressed/Changed HEP based on:   Cont HEP     Other Objective/Functional Measures:    Exercises per flowsheet     Post Treatment Pain Level (on 0 to 10) scale:   2  / 10     ASSESSMENT    Assessment/Changes in Function:     Tolerated exercises without complaints     []  See Progress Note/Recertification   Patient will continue to benefit from skilled PT services to modify and progress therapeutic interventions, address ROM deficits, address strength deficits, analyze and address soft tissue restrictions, analyze and cue movement patterns, assess and modify postural abnormalities and instruct in home and community integration to attain remaining goals. Progress toward goals / Updated goals:    Progressing toward goals:  1. Patient will be independent with HEP in order to demonstrate ability to perform therapeutic exercises and continue progressing functional mobility upon D/C - Compliant with HEP  2. Patient will improve L wrist flexion/extension/pronation/supination AROM within 5 degrees of uninvolved wrist in order to improve return to PLOF. - Cont ROM ex  3. Patient will demonstrate L  strength >/= 40# in order to improve ease with grasping, holding and transferring objects.   4. Patient will improve FOTO score to 56/100 to demonstrate a meaningful improvement in functional mobility and increased quality of life       PLAN    [x]  Upgrade activities as tolerated YES Continue plan of care   []  Discharge due to :    []  Other:      Therapist: Khang Baugh PT    Date: 7/20/2017 Time: 11:37 AM     Future Appointments  Date Time Provider Chao Lazcano   7/24/2017 1:00 PM Nghia Baer PT [facility]   7/27/2017 10:30 AM Nghia Baer PT [facility]

## 2017-07-24 ENCOUNTER — HOSPITAL ENCOUNTER (OUTPATIENT)
Dept: PHYSICAL THERAPY | Age: 57
Discharge: HOME | End: 2017-07-24
Payer: OTHER GOVERNMENT

## 2017-07-24 PROCEDURE — 97110 THERAPEUTIC EXERCISES: CPT

## 2017-07-24 NOTE — PROGRESS NOTES
PHYSICAL THERAPY - DAILY TREATMENT NOTE    Patient Name: Aminata Dougherty        Date: 2017  : 1960   YES Patient  Verified  Visit #:     Insurance: Payor: Lina Weeks / Plan: Jessie Sand / Product Type: Federal Funded Programs /      In time: 1:15 Out time: 2:00   Total Treatment Time: 45     Medicare Time Tracking (below)   Total Timed Codes (min):  na 1:1 Treatment Time:  na     TREATMENT AREA =  L wrist    SUBJECTIVE    Pain Level (on 0 to 10 scale):  3  / 10   Medication Changes/New allergies or changes in medical history, any new surgeries or procedures? NO    If yes, update Summary List   Subjective Functional Status/Changes:  []  No changes reported     \"Its been aching since it got hot. The ROM is improving. I still drop things sometimes.  Like my keys or phone\"         OBJECTIVE    Modalities Rationale:  Palliaitive   min [] Estim, type/location:                                      []  att     []  unatt     []  w/US     []  w/ice    []  w/heat    min []  Mechanical Traction: type/lbs                   []  pro   []  sup   []  int   []  cont    []  before manual    []  after manual    min []  Ultrasound, settings/location:      min []  Iontophoresis w/ dexamethasone, location:                                               []  take home patch       []  in clinic   10 min [x]  Ice     []  Heat    location/position: seated    min []  Vasopneumatic Device, press/temp:     min []  Other:    [] Skin assessment post-treatment (if applicable):    []  intact    []  redness- no adverse reaction     []redness  adverse reaction:      35 min Therapeutic Exercise:  [x]  See flow sheet   Rationale:      increase ROM and increase strength to improve the patients ability to perform ADLs      min Patient Education:  YES  Reviewed HEP   []  Progressed/Changed HEP based on:   Cont HEP     Other Objective/Functional Measures:    Pt reports increased ease with bathing and driving, folding clothes etc    Noted progressive ROM through reps during each exercise   Post Treatment Pain Level (on 0 to 10) scale:   3  / 10     ASSESSMENT    Assessment/Changes in Function:     Pt reporting increased ease with ADLs     []  See Progress Note/Recertification   Patient will continue to benefit from skilled PT services to analyze,, cue,, progress,, modify,, demonstrate,, instruct, and address, movement patterns,, therapeutic interventions,, postural abnormalities,, soft tissue restrictions,, ROM,, strength,, functional mobility, and body mechanics/ergonomics, to attain remaining goals.    Progress toward goals / Updated goals:    Reassess NV for MD note     PLAN    []  Upgrade activities as tolerated YES Continue plan of care   []  Discharge due to :    []  Other:      Therapist: Diana Koenig DPT    Date: 7/24/2017 Time: 1:12 PM   Future Appointments  Date Time Provider Chao Lazcano   7/27/2017 10:30 AM Anthony Dela Cruz, [facility]   8/1/2017 11:30 AM [address] [place] [address]   8/4/2017 1:00 PM [address] [place] [address]   8/7/2017 11:30 AM Anthony Dela Cruz, [facility]   8/9/2017 11:30 AM Anthony Dela Cruz, [facility]   8/14/2017 11:30 AM Anthony Dela Cruz, [facility]   8/16/2017 11:30 AM Anthony Dela Cruz, [facility]   8/21/2017 11:30 AM Duncan Osei, [facility]   8/23/2017 11:30 AM Anthony Dela Cruz, PT [facility]   8/28/2017 11:30 AM Duncan Osei, [facility]   8/30/2017 11:30 AM Anthony Dela Cruz, [facility]

## 2017-07-27 ENCOUNTER — HOSPITAL ENCOUNTER (OUTPATIENT)
Dept: PHYSICAL THERAPY | Age: 57
Discharge: HOME | End: 2017-07-27
Payer: OTHER GOVERNMENT

## 2017-07-27 PROCEDURE — 97110 THERAPEUTIC EXERCISES: CPT

## 2017-07-27 NOTE — PROGRESS NOTES
PHYSICAL THERAPY - DAILY TREATMENT NOTE    Patient Name: Bryson Jeff        Date: 2017  : 1960   YES Patient  Verified  Visit #:     Insurance: Payor: Og Campbell / Plan: Jeffrey Mckee / Product Type: Federal Funded Programs /      In time: 10:40 Out time: 11:00   Total Treatment Time: 20     Medicare Time Tracking (below)   Total Timed Codes (min):  na 1:1 Treatment Time:  na     TREATMENT AREA =  L wrist    SUBJECTIVE    Pain Level (on 0 to 10 scale):  3  / 10   Medication Changes/New allergies or changes in medical history, any new surgeries or procedures? NO    If yes, update Summary List   Subjective Functional Status/Changes:  []  No changes reported   Pt 10 mins late  PT: \"Is everything OK today? Is it your wrist bothering you more or something else\"  Pt: \"All of the above. I'm just hurting today\"          OBJECTIVE      20 min Therapeutic Exercise:  [x]  See flow sheet   Rationale:      reassessment to improve the patients ability to progress as tolerated      min Patient Education:  YES  Reviewed HEP   []  Progressed/Changed HEP based on:   Cont HEP     Other Objective/Functional Measures:  Pt reports overall improved ROM, improved swelling.  Cont with pain although slightly better  R hand 80lbs, 70lbs  L hand 20lbs with 3-4/10 pain   Left wrsit ext AROM/PROM 45/80deg  Flex AROM/PROM 20/39deg  Radial dev 25deg, ulnar dev 3deg  Supination 90deg  Pronation 85deg  Thumb ext 4-/5  Shd ER/IR 4+/5, elbow flex and ext 4+/5  FOTO no change       Post Treatment Pain Level (on 0 to 10) scale:   3   10     ASSESSMENT    Assessment/Changes in Function:     See MD note     []  See Progress Note/Recertification   Patient will continue to benefit from skilled PT services to analyze,, cue,, progress,, modify,, demonstrate,, instruct, and address, movement patterns,, therapeutic interventions,, postural abnormalities,, soft tissue restrictions,, ROM,, strength,, functional mobility,, body mechanics/ergonomics, and home and community integration, to attain remaining goals.    Progress toward goals / Updated goals:    See MD note     PLAN    []  Upgrade activities as tolerated YES Continue plan of care   []  Discharge due to :    []  Other:      Therapist: Diana Koenig DPT    Date: 7/27/2017 Time: 10:48 AM   Future Appointments  Date Time Provider Chao Lazcano   8/1/2017 11:30 AM [address] [place] [address]   8/4/2017 1:00 PM [address] [place] [address]   8/7/2017 11:30 AM Anthony Dela Cruz, [facility]   8/9/2017 11:30 AM Anthony Dela Cruz, [facility]   8/14/2017 11:30 AM Anthony Dela Cruz, [facility]   8/16/2017 11:30 AM Anthony Dela Cruz, [facility]   8/21/2017 11:30 AM Duncan Osei [facility]   8/23/2017 11:30 AM Anthony Dela Cruz, PT [facility]   8/28/2017 11:30 AM Duncan Osei, [facility]   8/30/2017 11:30 AM Anthony Dela Cruz, [facility]

## 2017-07-27 NOTE — PROGRESS NOTES
[address] [facility] PHYSICAL THERAPY  [address] [facility] [address], [address] - Phone: [phone]  Fax: [phone]  CONTINUED PLAN OF CARE/RECERTIFICATION FOR PHYSICAL THERAPY          Patient Name: Bryson Jeff : 1960   Treatment/Medical Diagnosis: Left arm pain [M79.602]   Onset Date: 16    Referral Source: Urban Marion, * Start of Care [facility]): 17   Prior Hospitalization: See Medical History Provider #: [id number]   Prior Level of Function: Independent without limitations   Comorbidities: BMI, Depression/Anxiety, Arthritis, HTN, Alcohol use   Medications: Verified on Patient Summary List   Visits from [facility]: 12 Missed Visits: 3     Goal/Measure of Progress Goal Met? 1. Patient will be independent with HEP in order to demonstrate ability to perform therapeutic exercises and continue progressing functional mobility upon D/C   Status at last Eval: Reports complaince Current Status: Not yet independent no   2. Patient will improve L wrist flexion/extension/pronation/supination AROM within 5 degrees of uninvolved wrist in order to improve return to PLOF. Status at last Eval: flex = 45 ext = 55, sup= 85 pro = 75, ulnar dev = 35 radial dev = 30 Current Status: AROM/PROM Ext 45/80deg  Flex 20/39deg  AROM  Rad dev 25deg, ulnar dev 30deg  Supination 90deg  Pronation 85deg See below   3. Patient will demonstrate L  strength >/= 40# in order to improve ease with grasping, holding and transferring objects. Status at last Eval: NA Current Status: 20lbs no   4. Patient will improve FOTO score to 56/100 to demonstrate a meaningful improvement in functional mobility and increased quality of life   Status at last Eval: 37 Current Status: 36 no     Key Functional Changes/Progress: Pt on average 10 minutes late to most sessions limiting some progression.  Pt has been seen for 12 visits of PT consisting of TE (ROM and strength), manual techniques,  And HEP, Cold packs PRN. Pt continues to report average 2-3/10 pain when presenting to session. Pt reporting overall increased ROM, decreased swelling and less pain. Objective measures are as follows:  : R hand 80lbs, 70lbs L hand 20lbs with 3-4/10 pain   Left wrist ext AROM/PROM 45/80deg  Flex AROM/PROM 20/39deg  Radial dev 25deg, ulnar dev 3deg  Supination 90deg  Pronation 85deg  Thumb ext 4-/5  Shd ER/IR 4+/5, elbow flex and ext 4+/5  FOTO no change  Problem List: pain affecting function, decrease ROM, decrease strength, edema affecting function, decrease ADL/ functional abilitiies, decrease activity tolerance, decrease flexibility/ joint mobility and decrease transfer abilities   Treatment Plan may include any combination of the following: Therapeutic exercise, Therapeutic activities, Neuromuscular re-education, Physical agent/modality, Manual therapy, Aquatic therapy, Patient education, Self Care training, Functional mobility training and Home safety training  Patient Goal(s) has been updated and includes:  Same as [facility]    Goals for this certification period include and are to be achieved in   4  weeks:  1. Patient will demonstrate L  strength >/= 40# in order to improve ease with grasping, holding and transferring objects. 2. Patient will improve FOTO score to 56/100 to demonstrate a meaningful improvement in functional mobility and increased quality of life  3. Pt will increase left wrist AROM ext to >/= 60deg for increased ease doing hair. 4. Pt will increase left wrist AROM/PROM to >/= 30/50deg for increased ease performing ADLs. Frequency / Duration:   Patient to be seen   2   times per week for   4    weeks:  G-Codes (GP): NA  Assessments/Recommendations: Pt would benefit from skilled PT to progress strength and promote increased ease with ADLs. If you have any questions/comments please contact us directly at [phone].    Thank you for allowing us to assist in the care of your patient. Therapist Signature: Urszula Christianson DPT Date: 3/92/9106   Certification Period:  Reporting Period: NA NA Time: 11:03 AM   NOTE TO PHYSICIAN:  PLEASE COMPLETE THE ORDERS BELOW AND FAX TO   [facility]: [phone]. If you are unable to process this request in 24 hours please contact our office: [phone].    ___ I have read the above report and request that my patient continue as recommended.   ___ I have read the above report and request that my patient continue therapy with the following changes/special instructions: ________________________________________________   ___ I have read the above report and request that my patient be discharged from therapy.      Physician Signature:        Date:       Time:

## 2017-08-01 ENCOUNTER — HOSPITAL ENCOUNTER (OUTPATIENT)
Dept: PHYSICAL THERAPY | Age: 57
Discharge: HOME | End: 2017-08-01
Payer: OTHER GOVERNMENT

## 2017-08-01 PROCEDURE — 97110 THERAPEUTIC EXERCISES: CPT

## 2017-08-01 NOTE — PROGRESS NOTES
PHYSICAL THERAPY - DAILY TREATMENT NOTE    Patient Name: Ana Laura Ramirez        Date: 2017  : 1960   YES Patient  Verified  Visit #:     Insurance: Payor: Sandra Hanna / Plan: Kyle Garza / Product Type: Federal Funded Programs /      In time: 11:30 Out time: 12:15   Total Treatment Time: 45     Medicare Time Tracking (below)   Total Timed Codes (min):  na 1:1 Treatment Time:  na     TREATMENT AREA =  L wrist     SUBJECTIVE    Pain Level (on 0 to 10 scale):  1  / 10   Medication Changes/New allergies or changes in medical history, any new surgeries or procedures? NO    If yes, update Summary List   Subjective Functional Status/Changes:  []  No changes reported     Pt reports that at her MD f/u they took x-rays and told her everything was healed. Pt states that her goal is to get back to doing push ups. Pt states that she wants to work on her posture because she feels like ever since her wrist was injured she's favoring her L side.         OBJECTIVE    Modalities Rationale:  palliative      min [] Estim, type/location:                                      []  att     []  unatt     []  w/US     []  w/ice    []  w/heat    min []  Mechanical Traction: type/lbs                   []  pro   []  sup   []  int   []  cont    []  before manual    []  after manual    min []  Ultrasound, settings/location:      min []  Iontophoresis w/ dexamethasone, location:                                               []  take home patch       []  in clinic   10 min [x]  Ice     []  Heat    location/position: sitting    min []  Vasopneumatic Device, press/temp:     min []  Other:    [x] Skin assessment post-treatment (if applicable):    [x]  intact    []  redness- no adverse reaction     []redness  adverse reaction:      35 min Therapeutic Exercise:  [x]  See flow sheet   Rationale:    increase ROM and increase strength to improve functional mobility of UE and allow for increased ease with ADL's     min Patient Education:  YES  Reviewed HEP   []  Progressed/Changed HEP based on: Other Objective/Functional Measures:    Received signed PN from MD advising may begin strengthening, ROM and stretching. Initiated scapular stabilization/strengthening ex for posture. Pt with c/o soreness L hand with gripping handles. VCing for neutral wrist with tband biceps curls/tricep extensions. Added therabar resisted supination and pronation. Pt demo's decreased AROM L>R wrist.      Post Treatment Pain Level (on 0 to 10) scale:   0-1  / 10     ASSESSMENT    Assessment/Changes in Function:     Pt with good tolerance to progression of ther-ex. []  See Progress Note/Recertification   Patient will continue to benefit from skilled PT services to modify and progress therapeutic interventions, address functional mobility deficits, address ROM deficits, address strength deficits, analyze and address soft tissue restrictions, assess and modify postural abnormalities and instruct in home and community integration to attain remaining goals. Progress toward goals / Updated goals:  · New goals from last assessment:     · Goals for this certification period include and are to be achieved in   4  weeks:  1. Patient will demonstrate L  strength >/= 40# in order to improve ease with grasping, holding and transferring objects. 2. Patient will improve FOTO score to 56/100 to demonstrate a meaningful improvement in functional mobility and increased quality of life  3. Pt will increase left wrist AROM ext to >/= 60deg for increased ease doing hair. 4. Pt will increase left wrist AROM/PROM to >/= 30/50deg for increased ease performing ADLs.       PLAN    []  Upgrade activities as tolerated YES Continue plan of care   []  Discharge due to :    []  Other:      Therapist: Eusebio Batista PTA    Date: 8/1/2017 Time: 11:32 AM     Future Appointments  Date Time Provider Chao Lazcano   8/4/2017 1:00 PM [address] [facility]   8/7/2017 11:30 AM Edgardo Shields PT [facility]   8/9/2017 11:30 AM Edgardo Shields PT [facility]   8/14/2017 11:30 AM Edgardo Shields PT [facility]   8/16/2017 11:30 AM Edgardo Shields PT [facility]   8/21/2017 11:30 AM Shwetha Bella PT [facility]   8/23/2017 11:30 AM Edgardo Shields PT [facility]   8/28/2017 11:30 AM Shwetha Bella PT [facility]   8/30/2017 11:30 AM Edgardo Shields PT [facility]

## 2017-08-04 ENCOUNTER — APPOINTMENT (OUTPATIENT)
Dept: PHYSICAL THERAPY | Age: 57
End: 2017-08-04
Payer: OTHER GOVERNMENT

## 2017-08-07 ENCOUNTER — APPOINTMENT (OUTPATIENT)
Dept: PHYSICAL THERAPY | Age: 57
End: 2017-08-07
Payer: OTHER GOVERNMENT

## 2017-08-09 ENCOUNTER — HOSPITAL ENCOUNTER (OUTPATIENT)
Dept: PHYSICAL THERAPY | Age: 57
Discharge: HOME | End: 2017-08-09
Payer: OTHER GOVERNMENT

## 2017-08-09 PROCEDURE — 97110 THERAPEUTIC EXERCISES: CPT

## 2017-08-09 NOTE — PROGRESS NOTES
PHYSICAL THERAPY - DAILY TREATMENT NOTE    Patient Name: Luzmaria Davis        Date: 2017  : 1960   YES Patient  Verified  Visit #:     Insurance: Payor: Shani Seth / Plan: Ken Zaman / Product Type: Federal Funded Programs /      In time: 11:30 Out time: 12:25   Total Treatment Time: 55     Medicare Time Tracking (below)   Total Timed Codes (min):  na 1:1 Treatment Time:  na     TREATMENT AREA =  Left wrist    SUBJECTIVE    Pain Level (on 0 to 10 scale):  2   10   Medication Changes/New allergies or changes in medical history, any new surgeries or procedures?     NO    If yes, update Summary List   Subjective Functional Status/Changes:  []  No changes reported     \"I did some dancing this weekend\"           OBJECTIVE    Modalities Rationale:  Palliative   min [] Estim, type/location:                                      []  att     []  unatt     []  w/US     []  w/ice    []  w/heat    min []  Mechanical Traction: type/lbs                   []  pro   []  sup   []  int   []  cont    []  before manual    []  after manual    min []  Ultrasound, settings/location:      min []  Iontophoresis w/ dexamethasone, location:                                               []  take home patch       []  in clinic   10 min [x]  Ice     []  Heat    location/position: Seated to left wrist    min []  Vasopneumatic Device, press/temp:     min []  Other:    [] Skin assessment post-treatment (if applicable):    []  intact    []  redness- no adverse reaction     []redness  adverse reaction:      45 min Therapeutic Exercise:  [x]  See flow sheet   Rationale:      increase ROM and increase strength to improve the patients ability to perform ADLs with increased ease      min Patient Education:  YES  Reviewed HEP   []  Progressed/Changed HEP based on:   Cont HEP     Other Objective/Functional Measures:  Good tolerance to new TE  Added Eccentric wrist eccentric with roller, pain with concentric     Post Treatment Pain Level (on 0 to 10) scale:   3  / 10     ASSESSMENT    Assessment/Changes in Function:     Cont with limited wrist ext AROM   []  See Progress Note/Recertification   Patient will continue to benefit from skilled PT services to analyze,, cue,, progress,, modify,, demonstrate,, instruct, and address, movement patterns,, therapeutic interventions,, postural abnormalities,, soft tissue restrictions,, ROM,, strength,, functional mobility,, body mechanics/ergonomics, and home and community integration, to attain remaining goals. Progress toward goals / Updated goals:     Added wrist eccentric roller     PLAN    []  Upgrade activities as tolerated YES Continue plan of care   []  Discharge due to :    []  Other:      Therapist: Karan Ordonez DPT    Date: 8/9/2017 Time: 11:29 AM   Future Appointments  Date Time Provider Chao Lazcano   8/9/2017 11:30 AM Renny Phelps [facility]   8/14/2017 11:30 AM Renny Phelps PT [facility]   8/16/2017 11:30 AM Renny Phelps PT [facility]   8/21/2017 11:30 AM June Wood PT [facility]   8/23/2017 11:30 AM Renny Phelps PT [facility]   8/28/2017 11:30 AM June Wood [facility]   8/30/2017 11:30 AM Renny Phelps PT [facility]

## 2017-08-14 ENCOUNTER — HOSPITAL ENCOUNTER (OUTPATIENT)
Dept: PHYSICAL THERAPY | Age: 57
Discharge: HOME | End: 2017-08-14
Payer: OTHER GOVERNMENT

## 2017-08-14 PROCEDURE — 97110 THERAPEUTIC EXERCISES: CPT

## 2017-08-14 NOTE — PROGRESS NOTES
PHYSICAL THERAPY - DAILY TREATMENT NOTE    Patient Name: Venkata Cox        Date: 2017  : 1960   YES Patient  Verified  Visit #:   15  of   18  Insurance: Payor: Sandip Boots / Plan: Liz Half / Product Type: Federal Funded Programs /      In time: 11:35 Out time: 12:15   Total Treatment Time: 40     Medicare Time Tracking (below)   Total Timed Codes (min):  NA 1:1 Treatment Time:  NA     TREATMENT AREA =  Left wrist    SUBJECTIVE    Pain Level (on 0 to 10 scale):  1  / 10   Medication Changes/New allergies or changes in medical history, any new surgeries or procedures?     NO    If yes, update Summary List   Subjective Functional Status/Changes:  []  No changes reported     \"My ROM is getting better but its been hurting more\"          OBJECTIVE    Modalities Rationale:  palliative   min [] Estim, type/location:                                      []  att     []  unatt     []  w/US     []  w/ice    []  w/heat    min []  Mechanical Traction: type/lbs                   []  pro   []  sup   []  int   []  cont    []  before manual    []  after manual    min []  Ultrasound, settings/location:      min []  Iontophoresis w/ dexamethasone, location:                                               []  take home patch       []  in clinic   10 min [x]  Ice     []  Heat    location/position: seated    min []  Vasopneumatic Device, press/temp:     min []  Other:    [] Skin assessment post-treatment (if applicable):    []  intact    []  redness- no adverse reaction     []redness  adverse reaction:      30 min Therapeutic Exercise:  [x]  See flow sheet   Rationale:      increase ROM and increase strength to improve the patients ability to perform ADLs with increased ease      min Patient Education:  YES  Reviewed HEP   []  Progressed/Changed HEP based on:   Cont HEP     Other Objective/Functional Measures:    Pt reports interm increased discomfort during session but not limiting to exercise  Pt reports aching interm in left metacarpals, no pain with ray mobs. STM improves in intrinsics   Post Treatment Pain Level (on 0 to 10) scale:   2  / 10     ASSESSMENT    Assessment/Changes in Function:     Progressing with ROM     []  See Progress Note/Recertification   Patient will continue to benefit from skilled PT services to analyze,, cue,, progress,, modify,, demonstrate,, instruct, and address, movement patterns,, therapeutic interventions,, postural abnormalities,, soft tissue restrictions,, ROM,, strength,, functional mobility,, body mechanics/ergonomics, and home and community integration, to attain remaining goals. Progress toward goals / Updated goals:    1. Patient will demonstrate L  strength >/= 40# in order to improve ease with grasping, holding and transferring objects. 2. Patient will improve FOTO score to 56/100 to demonstrate a meaningful improvement in functional mobility and increased quality of life  3. Pt will increase left wrist AROM ext to >/= 60deg for increased ease doing hair. COnt with eccentric strengthening  4. Pt will increase left wrist AROM/PROM to >/= 30/50deg for increased ease performing ADLs.       PLAN    []  Upgrade activities as tolerated YES Continue plan of care   []  Discharge due to :    []  Other:      Therapist: Meredith Crabtree DPT    Date: 8/14/2017 Time: 11:57 AM   Future Appointments  Date Time Provider Chao Lazcano   8/16/2017 11:30 AM Domitila Diamond PT [address]   8/21/2017 11:30 AM Sandeep Rudd [address]   8/23/2017 11:30 AM Domitila Diamond PT [address]   8/28/2017 11:30 AM Sandeep Rudd [address]   8/30/2017 11:30 AM Domitila Diamond [address]

## 2017-08-21 ENCOUNTER — HOSPITAL ENCOUNTER (OUTPATIENT)
Dept: PHYSICAL THERAPY | Age: 57
Discharge: HOME | End: 2017-08-21
Payer: OTHER GOVERNMENT

## 2017-08-21 PROCEDURE — 97110 THERAPEUTIC EXERCISES: CPT

## 2017-08-21 NOTE — PROGRESS NOTES
PHYSICAL THERAPY - DAILY TREATMENT NOTE    Patient Name: Sarah Robison        Date: 2017  : 1960   YES Patient  Verified  Visit #:      18  Insurance: Payor: Riddhi Sosa / Plan: Kayla Ruiz / Product Type: Federal Funded Programs /      In time: 11:30 Out time: 12:10   Total Treatment Time: 40     Medicare Time Tracking (below)   Total Timed Codes (min):  30 1:1 Treatment Time:  30     TREATMENT AREA =  Left arm pain [M79.602]  SUBJECTIVE    Pain Level (on 0 to 10 scale):  0  / 10   Medication Changes/New allergies or changes in medical history, any new surgeries or procedures? NO    If yes, update Summary List   Subjective Functional Status/Changes:  []  No changes reported     Pt reports stiffness/swelling L hand. Pt reports intermittent aches/pain L hand/forearm, especially when it rains. Pt reports that she has been using her L arm more. Pt reports that she has been able to use L hand to push up from chair. Pt reports that it was uncomfortable, but she was able to do it with more weight on R hand than L hand. Pt reports that she would like to be able to get back to doing pushups - she used to able to do 5 reps. Pt reports that she was scheduled for MRI of head (pituitary), but was unable to complete because she got claustrophobic. Pt reports that she is going back tomorrow. Pt reports that she saw her physician last week, and has been scheduled for a cardiac stress test and echocardiogram because she has some abnormalities on EKG. Pt reports that 2 of her heart valves don't close fully. Pt reports that she has been scheduled to see a nutritionist as well.        OBJECTIVE    Modalities Rationale: Palliative       min [] Estim, type/location:                                      []  att     []  unatt     []  w/US     []  w/ice    []  w/heat    min []  Mechanical Traction: type/lbs                   []  pro   []  sup   []  int   []  cont    []  before manual []  after manual    min []  Ultrasound, settings/location:      min []  Iontophoresis w/ dexamethasone, location:                                               []  take home patch       []  in clinic   10 min [x]  Ice     []  Heat    location/position: L wrist, seated    min []  Vasopneumatic Device, press/temp:     min []  Other:    [x] Skin assessment post-treatment (if applicable):    [x]  intact    []  redness- no adverse reaction     []redness  adverse reaction:      30 min Therapeutic Exercise:  [x]  See flow sheet   Rationale:      increase ROM, increase strength and increase proprioception to improve the patients ability to perform ADLs/IADLs without increased pain     During TE min Patient Education:  YES  Reviewed HEP   []  Progressed/Changed HEP based on:   Cont HEP     Other Objective/Functional Measures:    Exercises per flowsheet  Performed wrist flex/ext, rad/uln dev and pro/sup with 1# weight     Post Treatment Pain Level (on 0 to 10) scale:   0  / 10     ASSESSMENT    Assessment/Changes in Function:     Tolerated exercises without complaints     []  See Progress Note/Recertification   Patient will continue to benefit from skilled PT services to modify and progress therapeutic interventions, address functional mobility deficits, address ROM deficits, address strength deficits, analyze and address soft tissue restrictions, analyze and cue movement patterns, analyze and modify body mechanics/ergonomics and assess and modify postural abnormalities to attain remaining goals. Progress toward goals / Updated goals:    Progressing toward goals:  1. Patient will demonstrate L  strength >/= 40# in order to improve ease with grasping, holding and transferring objects. - Cont strengthening ex  2. Patient will improve FOTO score to 56/100 to demonstrate a meaningful improvement in functional mobility and increased quality of life  3.  Pt will increase left wrist AROM ext to >/= 60deg for increased ease doing hair. COnt with eccentric strengthening - Cont ROM ex  4. Pt will increase left wrist AROM/PROM to >/= 30/50deg for increased ease performing ADLs.  - Cont ROM ex       PLAN    [x]  Upgrade activities as tolerated YES Continue plan of care   []  Discharge due to :    []  Other:      Therapist: Roger Cary PT    Date: 8/21/2017 Time: 11:14 AM     Future Appointments  Date Time Provider Chao Lazcano   8/21/2017 11:30 AM Roger Cary PT [facility]   8/23/2017 11:30 AM Leonardo Gipson PT [facility]   8/28/2017 11:30 AM Roger Cary PT [facility]   8/30/2017 11:30 AM Leonardo Gipson, PT [facility]

## 2017-08-23 ENCOUNTER — HOSPITAL ENCOUNTER (OUTPATIENT)
Dept: PHYSICAL THERAPY | Age: 57
Discharge: HOME | End: 2017-08-23
Payer: OTHER GOVERNMENT

## 2017-08-23 PROCEDURE — 97110 THERAPEUTIC EXERCISES: CPT

## 2017-08-23 NOTE — PROGRESS NOTES
PHYSICAL THERAPY - DAILY TREATMENT NOTE    Patient Name: Johnny Em        Date: 2017  : 1960   YES Patient  Verified  Visit #:     Insurance: Payor: Nataly Macias / Plan: Gary Kerns / Product Type: Federal Funded Programs /      In time: 11:40 Out time: 12:20   Total Treatment Time: 40     Medicare Time Tracking (below)   Total Timed Codes (min):  na 1:1 Treatment Time:  na     TREATMENT AREA =  L wrist    SUBJECTIVE    Pain Level (on 0 to 10 scale):  0  / 10   Medication Changes/New allergies or changes in medical history, any new surgeries or procedures? NO    If yes, update Summary List   Subjective Functional Status/Changes:  []  No changes reported     \"I think I will be ready to wrap up next visit.            OBJECTIVE    Modalities Rationale:  Palliative   min [] Estim, type/location:                                      []  att     []  unatt     []  w/US     []  w/ice    []  w/heat    min []  Mechanical Traction: type/lbs                   []  pro   []  sup   []  int   []  cont    []  before manual    []  after manual    min []  Ultrasound, settings/location:      min []  Iontophoresis w/ dexamethasone, location:                                               []  take home patch       []  in clinic   10 min [x]  Ice     []  Heat    location/position: seated    min []  Vasopneumatic Device, press/temp:     min []  Other:    [] Skin assessment post-treatment (if applicable):    []  intact    []  redness- no adverse reaction     []redness  adverse reaction:      30 min Therapeutic Exercise:  [x]  See flow sheet   Rationale:      increase ROM and increase strength to improve the patients ability to perform ADLs      min Patient Education:  YES  Reviewed HEP   []  Progressed/Changed HEP based on:   Cont HEP, issue DC HEP NV     Other Objective/Functional Measures:    VC to decrease force and angle with SB wall pushups today     Post Treatment Pain Level (on 0 to 10) scale:   2  / 10     ASSESSMENT    Assessment/Changes in Function:     Reassess for DC     []  See Progress Note/Recertification   Patient will continue to benefit from skilled PT services to analyze,, cue,, progress,, modify,, demonstrate,, instruct, and address, movement patterns,, therapeutic interventions,, postural abnormalities,, soft tissue restrictions,, ROM,, strength, and functional mobility, to attain remaining goals.    Progress toward goals / Updated goals:    Reassess NV for DC     PLAN    []  Upgrade activities as tolerated YES Continue plan of care   []  Discharge due to :    []  Other:      Therapist: Dionna Nye DPT    Date: 8/23/2017 Time: 12:56 PM   Future Appointments  Date Time Provider Chao Lazcano   8/30/2017 11:30 AM Leonardo Gipson PT [facility]

## 2017-08-28 ENCOUNTER — APPOINTMENT (OUTPATIENT)
Dept: PHYSICAL THERAPY | Age: 57
End: 2017-08-28
Payer: OTHER GOVERNMENT

## 2017-08-30 ENCOUNTER — HOSPITAL ENCOUNTER (OUTPATIENT)
Dept: PHYSICAL THERAPY | Age: 57
Discharge: HOME | End: 2017-08-30
Payer: OTHER GOVERNMENT

## 2017-08-30 PROCEDURE — 97110 THERAPEUTIC EXERCISES: CPT

## 2017-08-30 NOTE — PROGRESS NOTES
PHYSICAL THERAPY - DAILY TREATMENT NOTE    Patient Name: Dontrell Arias        Date: 2017  : 1960   YES Patient  Verified  Visit #:   18   of   18  Insurance: Payor: Leah Covert / Plan: Bola Cruz / Product Type: Federal Funded Programs /      In time: 11:30 Out time: 12:10   Total Treatment Time: 40     Medicare Time Tracking (below)   Total Timed Codes (min):  na 1:1 Treatment Time:  NA     TREATMENT AREA =  l WRIST    SUBJECTIVE    Pain Level (on 0 to 10 scale):  2  / 10   Medication Changes/New allergies or changes in medical history, any new surgeries or procedures? NO    If yes, update Summary List   Subjective Functional Status/Changes:  []  No changes reported     Karlos James been doing a lot. Making beds, cleaning, washed the car.  I saw the Doc and they want me to continue\"          OBJECTIVE    Modalities Rationale:  Palliativ   min [] Estim, type/location:                                      []  att     []  unatt     []  w/US     []  w/ice    []  w/heat    min []  Mechanical Traction: type/lbs                   []  pro   []  sup   []  int   []  cont    []  before manual    []  after manual    min []  Ultrasound, settings/location:      min []  Iontophoresis w/ dexamethasone, location:                                               []  take home patch       []  in clinic   10 min [x]  Ice     []  Heat    location/position: seated    min []  Vasopneumatic Device, press/temp:     min []  Other:    [] Skin assessment post-treatment (if applicable):    []  intact    []  redness- no adverse reaction     []redness  adverse reaction:      30 min Therapeutic Exercise:  [x]  See flow sheet   Rationale:      increase ROM, increase strength and update HEP to improve the patients ability to self manage and perform ADLs with increased ease      min Patient Education:  YES  Reviewed HEP   []  Progressed/Changed HEP based on:   Updated HEP     Other Objective/Functional Measures:  Left wrist ext AROM/PROM 60/83  Flex 35/67deg  Rad Dev 23deg  Left  35lbs  Flex pain MMT 4-/5  FOTO NT   Post Treatment Pain Level (on 0 to 10) scale:   2  / 10     ASSESSMENT    Assessment/Changes in Function:     See DM note     []  See Progress Note/Recertification   Patient will continue to benefit from skilled PT services to analyze,, cue,, progress,, modify,, demonstrate,, instruct, and address, movement patterns,, therapeutic interventions,, postural abnormalities,, soft tissue restrictions,, ROM,, strength,, functional mobility,, body mechanics/ergonomics, and home and community integration, to attain remaining goals. Progress toward goals / Updated goals:    See MD note     PLAN    []  Upgrade activities as tolerated YES Continue plan of care   []  Discharge due to :    []  Other:      Therapist: Carlos Coello DPT    Date: 8/30/2017 Time: 11:40 AM   No future appointments.

## 2017-08-30 NOTE — PROGRESS NOTES
[address] [facility] PHYSICAL THERAPY  [address] [facility] [address] [address], [address] - Phone: [phone]  Fax: [phone]  CONTINUED PLAN OF CARE/RECERTIFICATION FOR PHYSICAL THERAPY          Patient Name: Alicia Walton : 1960   Treatment/Medical Diagnosis: Left arm pain [M79.602]   Onset Date: 17    Referral Source: José Luis Aaron, * Start of Care [facility]): 17   Prior Hospitalization: See Medical History Provider #: [id number]   Prior Level of Function: Independent without limitations   Comorbidities: BMI, Depression/Anxiety, Arthritis, HTN, Alcohol use   Medications: Verified on Patient Summary List   Visits from [facility]: 18 Missed Visits: 3     Goal/Measure of Progress Goal Met? 1. Patient will demonstrate L  strength >/= 40# in order to improve ease with grasping, holding and transferring objects. Status at last Eval: 20lbs Current Status: 35lbs Progressing   2. Patient will improve FOTO score to 56/100 to demonstrate a meaningful improvement in functional mobility and increased quality of life   Status at last Eval: 36 Current Status: NT n/a   3. Pt will increase left wrist AROM ext to >/= 60deg for increased ease doing hair   Status at last Eval: AROM/PROM  45/80 Current Status: AROM/PROM  60/83 yes   4. Pt will increase left wrist flex AROM/PROM to >/= 30/50deg for increased ease performing ADLs   Status at last Eval: AROM/PROM  20/39deg Current Status: AROM/PROM  35/67deg yes     Key Functional Changes/Progress: Pt has completed x 18 visits of PT consisting currently of active warm-up on UBE, ROM, Strengthening of wrist/elbow/shoulder girdle. Pt making steady progress with past month. Currently Obj measures are as follows:   Left wrist ext AROM/PROM 60/83deg  Flex 35/67deg  AROM Rad Dev 23deg  Left  35lbs  Left wrist Ext MMT 4/5 and Flex 4-/5 with pain during flexion.    FOTO NT  Problem List: pain affecting function, decrease ROM, decrease strength, edema affecting function, decrease ADL/ functional abilitiies, decrease activity tolerance and decrease flexibility/ joint mobility   Treatment Plan may include any combination of the following: Therapeutic exercise, Therapeutic activities, Neuromuscular re-education, Physical agent/modality, Manual therapy, Patient education, Self Care training, Functional mobility training and Home safety training  Patient Goal(s) has been updated and includes: \"Be able to do push-ups\"    Goals for this certification period include and are to be achieved in   2-4  weeks:  1. Pt will increase FS FOTO Score to >/= 56 to indicate a significant decrease in functional disability. 2. Pt will increase left wrist flex MMT to 4/5 without increased pain for ease with modified push-ups. 3. Pt will demo left wrist flex AROM to >/= 60deg for increased ease with vacuuming. 4. Pt will be independent with HEP at D/C for self management. Frequency / Duration:   Patient to be seen   2   times per week for   2-4    weeks:  G-Codes (GP): NA  Assessments/Recommendations: Pt would benefit from cont skilled PT to address remaining strength and activity limitation and promote return to full ADLs and safe recreation. If you have any questions/comments please contact us directly at [phone]. Thank you for allowing us to assist in the care of your patient. Therapist Signature: Shae Morocho DPT Date: 4/33/8242   Certification Period:  Reporting Period: NA  NA Time: 1:13 PM   NOTE TO PHYSICIAN:  PLEASE COMPLETE THE ORDERS BELOW AND FAX TO   [facility] Physical Therapy: [phone].   If you are unable to process this request in 24 hours please contact our office: [phone].    ___ I have read the above report and request that my patient continue as recommended.   ___ I have read the above report and request that my patient continue therapy with the following changes/special instructions: ________________________________________________   ___ I have read the above report and request that my patient be discharged from therapy.      Physician Signature:        Date:       Time:

## 2017-09-25 ENCOUNTER — HOSPITAL ENCOUNTER (OUTPATIENT)
Dept: PHYSICAL THERAPY | Age: 57
Discharge: HOME | End: 2017-09-25

## 2017-09-25 NOTE — PROGRESS NOTES
[address] PHYSICAL THERAPY  [address] [place] [address] Carmen Gibson, [address] - Phone: [phone]  Fax: [phone]  CONTINUED PLAN OF CARE/RECERTIFICATION FOR PHYSICAL THERAPY          Patient Name: Yudith Cobian : 1960   Treatment/Medical Diagnosis: Left arm pain [M79.602]   Onset Date: 17    Referral Source: Abraham Cruz, * Start of Care [facility]): 17   Prior Hospitalization: See Medical History Provider #: [id number]   Prior Level of Function: Independent without limitations   Comorbidities: BMI, Depression/Anxiety, Arthritis, HTN, Alcohol use, pituitary tumor    Medications: Verified on Patient Summary List   Visits from [facility]: 18 Missed Visits: 3     Key Functional Changes/Progress: Pt was last seen and reassessed on 17. She didn't return for follow-up sessions due to personal reasons, too many conflicting doctor's appts (cardiac work-up, MRI for pituitary tumor, thyroid etc) and re-onset of PSTD. Pt presents today for objective measure reassessment. She states she has attempted to the grass and was in pain following. Cont with Trouble pushing up.    Average daily scale ~2-3/10, takes Aleve daily for general OA  Left wrist AROM/PROM 66/84deg  Flex AROM/PROM 45/80deg   Radial Dev 28/33deg   Left 30 and 25lbs  Right 70 and 65deg  Left wrist flex and ext 4/5   Bilateral shd and elbow strength grossly 4/5-4+/5 and symmetrical   .   Problem List: pain affecting function, decrease ROM, decrease strength, decrease activity tolerance, decrease flexibility/ joint mobility and decrease transfer abilities   Treatment Plan may include any combination of the following: Therapeutic exercise, Therapeutic activities, Neuromuscular re-education, Physical agent/modality, Manual therapy, Aquatic therapy, Patient education, Self Care training, Functional mobility training and Home safety training  Patient Goal(s) has been updated and includes:  Be able to push off of my hand    Goals for this certification period include and are to be achieved in   4  weeks:  1. Pt will increase FS FOTO Score to >/= 56 to indicate a significant decrease in functional disability. 2. Pt will increase left wrist flex MMT to 4/5 without increased pain for ease with modified push-ups. 3. Pt will demo left wrist flex AROM to >/= 60deg for increased ease with vacuuming. 4. Pt will be independent with HEP at D/C for self management. Frequency / Duration:   Patient to be seen   2   times per week for   4    weeks:  G-Codes (GP): XAVIER  Assessments/Recommendations: Pt currently past expiration date for [place] authorization. Will require updated auth dates to continue with skilled PT to address above deficits. If you have any questions/comments please contact us directly at [phone]. Thank you for allowing us to assist in the care of your patient. Therapist Signature: Diana Koenig DPT Date: 5/65/7302   Certification Period:  Reporting Period: NA  NA Time: 3:27 PM   NOTE TO PHYSICIAN:  PLEASE COMPLETE THE ORDERS BELOW AND FAX TO   [facility]: [phone]. If you are unable to process this request in 24 hours please contact our office: [phone].    ___ I have read the above report and request that my patient continue as recommended.   ___ I have read the above report and request that my patient continue therapy with the following changes/special instructions: ________________________________________________   ___ I have read the above report and request that my patient be discharged from therapy.      Physician Signature:        Date:       Time:

## 2017-09-25 NOTE — PROGRESS NOTES
PHYSICAL THERAPY - DAILY TREATMENT NOTE    Patient Name: Nicolasa Rodriguez        Date: 2017  : 1960   YES Patient  Verified  Visit #:     Insurance: Payor: Cyrus Rossi / Plan: [employer] / Product Type: Federal Funded Programs /      In time: 330 Out time: 4:10   Total Treatment Time: 40     Medicare Time Tracking (below)   Total Timed Codes (min):  NA 1:1 Treatment Time:  NA     TREATMENT AREA =  L wrist    SUBJECTIVE    Pain Level (on 0 to 10 scale):  3  / 10   Medication Changes/New allergies or changes in medical history, any new surgeries or procedures?     YES    If yes, update Summary List. See below   Subjective Functional Status/Changes:  []  No changes reported     \"My PTSD has been triggered so that was part of why I wasn't here\"       OBJECTIVE  10 mins ice seated     10 min Therapeutic Exercise:  [x]  See flow sheet   Rationale:      increase ROM and increase strength to improve the patients ability to continue to gain strength     20 min Therapeutic Activity: FOTO assessment   Rationale:   reassessment   min Patient Education:  YES  Reviewed HEP   []  Progressed/Changed HEP based on:   Cont HEP     Other Objective/Functional Measures:  Pt reports she cut the grass and was in pain following  Trouble pushing up, noted increased room but challenged pushing with #  Average daily scale ~2-3/10, takes Aleve daily for general OA  Left wrist AROM/PROM 66/84deg  Flex AROM/PROM 45/80deg   Radial Dev 28/33deg   Left 30 and 25lbs  Right 70 and 65deg  Left wrist flex and ext 4/5   Bilateral shd and elbow strength grossly 4/5-4+/5 and symmetrical   Pt under going multiple diagnostic testing- vision/cardiac/thyroid/and MRI for pituitary   Post Treatment Pain Level (on 0 to 10) scale:   3  / 10     ASSESSMENT    Assessment/Changes in Function:     See MD note   []  See Progress Note/Recertification   Patient will continue to benefit from skilled PT services to analyze,, cue,, progress,, modify,, demonstrate,, instruct, and address, movement patterns,, therapeutic interventions,, postural abnormalities,, soft tissue restrictions,, ROM,, strength,, functional mobility,, body mechanics/ergonomics, and home and community integration, to attain remaining goals.    Progress toward goals / Updated goals:    See MD note     PLAN    []  Upgrade activities as tolerated YES Continue plan of care   []  Discharge due to :    []  Other:      Therapist: Karan Ordonez DPT    Date: 9/25/2017 Time: 3:26 PM   Future Appointments  Date Time Provider Chao Lazcano   9/25/2017 3:30 PM Renny Phelps PT [facility] AT [facility]

## 2018-06-07 NOTE — PROGRESS NOTES
[address]  [facility] [place] [facility]  [address], [address] - Phone: [phone]  Fax: [phone]  DISCHARGE SUMMARY FOR PHYSICAL THERAPY          Patient Name: Rica Ford : 1960   Treatment/Medical Diagnosis: Left arm pain [M79.602]   Onset Date: 17    Referral Source: Sari Parra, * Start of Care [facility]): 17   Prior Hospitalization: 17 DOS Provider #: [id number]   Prior Level of Function: Independent without limitations   Comorbidities: BMI, Depression/Anxiety, Arthritis, HTN, Alcohol use, pituitary tumor    Medications: Verified on Patient Summary List   Visits from [facility]: 19 Missed Visits: 5     Key Functional Changes/Progress: Pt was last seen on 17 in which an updated progress note was written. Pt was then placed on hold due insurance and waiting on updated authorization for physical therapy. Pt did not receive further authorization and will be discharged at this time. We appreciate the kind referral and would willingly work with this patient again should her insurance authorize. Your patient's health is our primary concern. Thank you! G-Codes (GP): NA  Assessments/Recommendations: Other: Insurance not reathorized    If you have any questions/comments please contact us directly at [phone]. Thank you for allowing us to assist in the care of your patient. Therapist Signature: Urszula Christianson DPT Date: 2018   Reporting Period: NA Time: 64:02 PM      Certification Period: NA       NOTE TO PHYSICIAN:  PLEASE COMPLETE THE ORDERS BELOW AND FAX TO   [facility]: [phone]. If you are unable to process this request in 24 hours please contact our office: [phone].    ___ I have read the above report and request that my patient be discharged from therapy.      Physician Signature:        Date:       Time: